# Patient Record
Sex: MALE | URBAN - METROPOLITAN AREA
[De-identification: names, ages, dates, MRNs, and addresses within clinical notes are randomized per-mention and may not be internally consistent; named-entity substitution may affect disease eponyms.]

---

## 2023-05-04 ENCOUNTER — OFFICE VISIT (OUTPATIENT)
Dept: URBAN - METROPOLITAN AREA CLINIC 71 | Facility: CLINIC | Age: 58
End: 2023-05-04
Payer: COMMERCIAL

## 2023-05-04 DIAGNOSIS — H04.123 DRY EYE SYNDROME OF BILATERAL LACRIMAL GLANDS: ICD-10-CM

## 2023-05-04 DIAGNOSIS — H25.13 AGE-RELATED NUCLEAR CATARACT, BILATERAL: Primary | ICD-10-CM

## 2023-05-04 DIAGNOSIS — H52.4 PRESBYOPIA: ICD-10-CM

## 2023-05-04 PROCEDURE — 99203 OFFICE O/P NEW LOW 30 MIN: CPT

## 2023-05-04 ASSESSMENT — INTRAOCULAR PRESSURE
OS: 12
OD: 12

## 2023-05-04 ASSESSMENT — VISUAL ACUITY
OS: 20/20
OD: 20/20

## 2023-05-04 NOTE — IMPRESSION/PLAN
Impression: Dry eye syndrome of bilateral lacrimal glands: H04.123. Plan: Educated pt on dry eye, the effects it can have on the eyes and [place], along with treatment options. Recommend OTC artificial tear use 2-4x daily w/ emphasis on QAM and QHS doses. Recommend warm compress w/ lid massage.

## 2023-05-04 NOTE — IMPRESSION/PLAN
Impression: Age-related nuclear cataract, bilateral: H25.13. Cataracts are mild and not interfering to much with his vision. Plan: No treatment currently recommended. The patient will monitor vision changes and contact us with any decrease in vision.

## 2023-05-04 NOTE — IMPRESSION/PLAN
Impression: Presbyopia: H52.4. PT made aware he needs to wear glasses when driving he is not legal to drive without glasses based on testing today. Plan: Dispensed new glasses Rx today and discussed changes and adaptation period since this is his first pair of glasses. Patient to call if any issues with new glasses occur.

## 2023-09-12 ENCOUNTER — TESTING ONLY (OUTPATIENT)
Dept: URBAN - METROPOLITAN AREA CLINIC 71 | Facility: CLINIC | Age: 58
End: 2023-09-12

## 2023-09-12 DIAGNOSIS — H52.4 PRESBYOPIA: Primary | ICD-10-CM

## 2023-09-12 PROCEDURE — 92015 DETERMINE REFRACTIVE STATE: CPT

## 2023-09-12 ASSESSMENT — INTRAOCULAR PRESSURE
OS: 13
OD: 13

## 2023-09-12 ASSESSMENT — VISUAL ACUITY
OS: 20/20
OD: 20/20

## 2024-02-05 ENCOUNTER — OFFICE VISIT (OUTPATIENT)
Dept: URBAN - METROPOLITAN AREA CLINIC 71 | Facility: CLINIC | Age: 59
End: 2024-02-05
Payer: COMMERCIAL

## 2024-02-05 DIAGNOSIS — H52.4 PRESBYOPIA: ICD-10-CM

## 2024-02-05 DIAGNOSIS — H25.13 AGE-RELATED NUCLEAR CATARACT, BILATERAL: Primary | ICD-10-CM

## 2024-02-05 DIAGNOSIS — H43.811 VITREOUS DEGENERATION, RIGHT EYE: ICD-10-CM

## 2024-02-05 PROCEDURE — 92012 INTRM OPH EXAM EST PATIENT: CPT | Performed by: OPTOMETRIST

## 2024-02-05 ASSESSMENT — VISUAL ACUITY
OS: 20/20
OD: 20/25

## 2024-02-05 ASSESSMENT — INTRAOCULAR PRESSURE
OS: 13
OD: 12

## 2024-09-03 ENCOUNTER — OFFICE VISIT (OUTPATIENT)
Dept: URBAN - METROPOLITAN AREA CLINIC 71 | Facility: CLINIC | Age: 59
End: 2024-09-03
Payer: COMMERCIAL

## 2024-09-03 DIAGNOSIS — H43.821 VITREOMACULAR ADHESION, RIGHT EYE: ICD-10-CM

## 2024-09-03 DIAGNOSIS — H25.13 AGE-RELATED NUCLEAR CATARACT, BILATERAL: ICD-10-CM

## 2024-09-03 DIAGNOSIS — H52.4 PRESBYOPIA: ICD-10-CM

## 2024-09-03 DIAGNOSIS — H43.811 VITREOUS DEGENERATION, RIGHT EYE: Primary | ICD-10-CM

## 2024-09-03 PROCEDURE — 92134 CPTRZ OPH DX IMG PST SGM RTA: CPT | Performed by: OPTOMETRIST

## 2024-09-03 PROCEDURE — 99213 OFFICE O/P EST LOW 20 MIN: CPT | Performed by: OPTOMETRIST

## 2024-09-03 ASSESSMENT — INTRAOCULAR PRESSURE
OS: 14
OD: 15

## 2024-09-03 ASSESSMENT — VISUAL ACUITY
OD: 20/25
OS: 20/25